# Patient Record
Sex: MALE
[De-identification: names, ages, dates, MRNs, and addresses within clinical notes are randomized per-mention and may not be internally consistent; named-entity substitution may affect disease eponyms.]

---

## 2019-10-15 ENCOUNTER — APPOINTMENT (OUTPATIENT)
Dept: OTOLARYNGOLOGY | Facility: CLINIC | Age: 64
End: 2019-10-15
Payer: COMMERCIAL

## 2019-10-15 VITALS
HEIGHT: 71 IN | HEART RATE: 53 BPM | SYSTOLIC BLOOD PRESSURE: 140 MMHG | DIASTOLIC BLOOD PRESSURE: 83 MMHG | BODY MASS INDEX: 27.3 KG/M2 | WEIGHT: 195 LBS

## 2019-10-15 DIAGNOSIS — R13.14 DYSPHAGIA, PHARYNGOESOPHAGEAL PHASE: ICD-10-CM

## 2019-10-15 DIAGNOSIS — H90.3 SENSORINEURAL HEARING LOSS, BILATERAL: ICD-10-CM

## 2019-10-15 DIAGNOSIS — Z78.9 OTHER SPECIFIED HEALTH STATUS: ICD-10-CM

## 2019-10-15 DIAGNOSIS — Z83.49 FAMILY HISTORY OF OTHER ENDOCRINE, NUTRITIONAL AND METABOLIC DISEASES: ICD-10-CM

## 2019-10-15 DIAGNOSIS — Z87.09 PERSONAL HISTORY OF OTHER DISEASES OF THE RESPIRATORY SYSTEM: ICD-10-CM

## 2019-10-15 DIAGNOSIS — Z86.39 PERSONAL HISTORY OF OTHER ENDOCRINE, NUTRITIONAL AND METABOLIC DISEASE: ICD-10-CM

## 2019-10-15 DIAGNOSIS — Z86.79 PERSONAL HISTORY OF OTHER DISEASES OF THE CIRCULATORY SYSTEM: ICD-10-CM

## 2019-10-15 DIAGNOSIS — K21.9 GASTRO-ESOPHAGEAL REFLUX DISEASE W/OUT ESOPHAGITIS: ICD-10-CM

## 2019-10-15 DIAGNOSIS — H93.12 TINNITUS, LEFT EAR: ICD-10-CM

## 2019-10-15 PROBLEM — Z00.00 ENCOUNTER FOR PREVENTIVE HEALTH EXAMINATION: Status: ACTIVE | Noted: 2019-10-15

## 2019-10-15 PROCEDURE — 92567 TYMPANOMETRY: CPT

## 2019-10-15 PROCEDURE — 99203 OFFICE O/P NEW LOW 30 MIN: CPT | Mod: 25

## 2019-10-15 PROCEDURE — 31575 DIAGNOSTIC LARYNGOSCOPY: CPT

## 2019-10-15 PROCEDURE — 92557 COMPREHENSIVE HEARING TEST: CPT

## 2019-10-15 RX ORDER — OMEPRAZOLE 40 MG/1
40 CAPSULE, DELAYED RELEASE ORAL
Refills: 0 | Status: ACTIVE | COMMUNITY

## 2019-10-15 RX ORDER — LISINOPRIL 20 MG/1
20 TABLET ORAL
Refills: 0 | Status: ACTIVE | COMMUNITY

## 2019-10-15 RX ORDER — THYROID 90 MG/1
TABLET ORAL
Refills: 0 | Status: ACTIVE | COMMUNITY

## 2019-10-15 RX ORDER — AMLODIPINE BESYLATE 5 MG/1
5 TABLET ORAL
Refills: 0 | Status: ACTIVE | COMMUNITY

## 2019-10-15 NOTE — CONSULT LETTER
[Dear  ___] : Dear  [unfilled], [Consult Letter:] : I had the pleasure of evaluating your patient, [unfilled]. [Please see my note below.] : Please see my note below. [Sincerely,] : Sincerely, [Consult Closing:] : Thank you very much for allowing me to participate in the care of this patient.  If you have any questions, please do not hesitate to contact me. [FreeTextEntry3] : Imelda Shah MD\par

## 2019-10-15 NOTE — HISTORY OF PRESENT ILLNESS
[de-identified] : YAW EATON is a 64 year patient with a 3 weeks history of left ear pressure and tinnitus.  He has a sensation of eustachian tube dysfunction.  He had a popping in the left ear which initially made it worse. It has gotten a little bit better. He has no otalgia, otorrhea, or dizziness.  HE failed a screening test at his doctor's office. He has no history of recurrent ear infections, prior otologic surgery, ear/head trauma.  He had some noise exposure when younger.  He denies a family history of hearing loss.  \par \par He has dysphagia to pills for several months.  He had a recent upper endoscopy. He is on a PPI which he just started taking daily.  He said a thyroid ultrasound was normal. He has a barium swallow pending.  He does not smoke. He has no voice change. He has a cough.

## 2019-10-15 NOTE — ASSESSMENT
[FreeTextEntry1] : He has a 3 week history of left abnormal auditory perception, eustachian tube dysfunction, and tinnitus. His symptoms have improved. His ear exam was normal. Audiogram showed a bilateral high frequency sensorineural hearing loss with mild asymmetry in both ears. He had normal tympanograms although he had -100 pressure in the left middle ear. He does have a history of reflux. He has dysphagia and other symptoms likely due to that. Flexible laryngoscopy showed mild nasal mucosal edema, deviated septum, and reflux related laryngeal changes. The reflux may also cause the ear pressure. he also has eustachian tube dysfunction when he flies\par \par PLAN\par \par -findings and management options discussed in detail with the patient. \par -good aural hygiene\par -avoid using cotton swabs in the ears\par -wax removal drops as needed. \par -noise precautions\par -Monitor hearing. We discussed the small possibility of retrocochlear pathology. I'm going to observe him a little while longer since his symptoms have improved. If he has persistent tinnitus and fullness on the left side, we will discuss further workup with ABR or MRI\par -I gave him literature regarding flying with ETD\par -Reflux precautions and medication for reflux\par -Consider allergy evaluation\par -follow up in 2-3 months. \par -call and return earlier if any concerns. \par